# Patient Record
Sex: FEMALE | Race: WHITE | NOT HISPANIC OR LATINO
[De-identification: names, ages, dates, MRNs, and addresses within clinical notes are randomized per-mention and may not be internally consistent; named-entity substitution may affect disease eponyms.]

---

## 2017-06-21 ENCOUNTER — APPOINTMENT (OUTPATIENT)
Dept: OBGYN | Facility: CLINIC | Age: 72
End: 2017-06-21

## 2017-06-21 VITALS
WEIGHT: 134 LBS | HEART RATE: 68 BPM | HEIGHT: 61 IN | SYSTOLIC BLOOD PRESSURE: 154 MMHG | DIASTOLIC BLOOD PRESSURE: 69 MMHG | BODY MASS INDEX: 25.3 KG/M2

## 2017-06-21 DIAGNOSIS — Z01.419 ENCOUNTER FOR GYNECOLOGICAL EXAMINATION (GENERAL) (ROUTINE) W/OUT ABNORMAL FINDINGS: ICD-10-CM

## 2018-06-22 ENCOUNTER — APPOINTMENT (OUTPATIENT)
Dept: OBGYN | Facility: CLINIC | Age: 73
End: 2018-06-22
Payer: MEDICARE

## 2018-06-22 VITALS
WEIGHT: 137 LBS | DIASTOLIC BLOOD PRESSURE: 84 MMHG | BODY MASS INDEX: 25.86 KG/M2 | SYSTOLIC BLOOD PRESSURE: 136 MMHG | HEIGHT: 61 IN

## 2018-06-22 DIAGNOSIS — R10.2 PELVIC AND PERINEAL PAIN: ICD-10-CM

## 2018-06-22 PROCEDURE — G0101: CPT

## 2018-06-22 PROCEDURE — 99213 OFFICE O/P EST LOW 20 MIN: CPT | Mod: 25

## 2018-06-22 RX ORDER — AMITRIPTYLINE HYDROCHLORIDE 10 MG/1
10 TABLET, FILM COATED ORAL
Qty: 28 | Refills: 6 | Status: DISCONTINUED | COMMUNITY
Start: 2018-06-22 | End: 2018-06-22

## 2018-06-26 LAB — CYTOLOGY CVX/VAG DOC THIN PREP: NORMAL

## 2018-09-27 ENCOUNTER — APPOINTMENT (OUTPATIENT)
Dept: OBGYN | Facility: CLINIC | Age: 73
End: 2018-09-27
Payer: MEDICARE

## 2018-09-27 VITALS
HEIGHT: 61 IN | WEIGHT: 135 LBS | BODY MASS INDEX: 25.49 KG/M2 | DIASTOLIC BLOOD PRESSURE: 84 MMHG | SYSTOLIC BLOOD PRESSURE: 142 MMHG

## 2018-09-27 DIAGNOSIS — N89.8 OTHER SPECIFIED NONINFLAMMATORY DISORDERS OF VAGINA: ICD-10-CM

## 2018-09-27 DIAGNOSIS — N95.2 POSTMENOPAUSAL ATROPHIC VAGINITIS: ICD-10-CM

## 2018-09-27 PROCEDURE — 99214 OFFICE O/P EST MOD 30 MIN: CPT

## 2018-10-01 LAB — BACTERIA GENITAL AEROBE CULT: NORMAL

## 2020-12-16 ENCOUNTER — APPOINTMENT (OUTPATIENT)
Dept: OBGYN | Facility: CLINIC | Age: 75
End: 2020-12-16

## 2022-01-04 ENCOUNTER — APPOINTMENT (OUTPATIENT)
Dept: OBGYN | Facility: CLINIC | Age: 77
End: 2022-01-04
Payer: MEDICARE

## 2022-01-04 VITALS
WEIGHT: 133 LBS | DIASTOLIC BLOOD PRESSURE: 80 MMHG | SYSTOLIC BLOOD PRESSURE: 130 MMHG | BODY MASS INDEX: 25.11 KG/M2 | HEIGHT: 61 IN

## 2022-01-04 DIAGNOSIS — M81.0 AGE-RELATED OSTEOPOROSIS W/OUT CURRENT PATHOLOGICAL FRACTURE: ICD-10-CM

## 2022-01-04 DIAGNOSIS — Z00.00 ENCOUNTER FOR GENERAL ADULT MEDICAL EXAMINATION W/OUT ABNORMAL FINDINGS: ICD-10-CM

## 2022-01-04 PROCEDURE — G0101: CPT

## 2022-01-04 PROCEDURE — 99213 OFFICE O/P EST LOW 20 MIN: CPT | Mod: 25

## 2022-01-04 PROCEDURE — 82270 OCCULT BLOOD FECES: CPT

## 2022-01-04 NOTE — PHYSICAL EXAM
[Awake] : awake [Alert] : alert [Acute Distress] : no acute distress [Mass] : no breast mass [Nipple Discharge] : no nipple discharge [Axillary LAD] : no axillary lymphadenopathy [Soft] : soft [Tender] : non tender [Oriented x3] : oriented to person, place, and time [Normal] : uterus [Atrophy] : atrophy [No Bleeding] : there was no active vaginal bleeding [Uterine Adnexae] : were not tender and not enlarged [Occult Blood] : occult blood test from digital rectal exam was negative [Nl Sphincter Tone] : normal sphincter tone [RRR, No Murmurs] : RRR, no murmurs [CTAB] : CTAB

## 2022-01-04 NOTE — HISTORY OF PRESENT ILLNESS
[FreeTextEntry1] : 75 yo pt here for annual exam. no gyn co. has worsening Parkinsons, now not well controlled w meds. No similar fam hx, may have been related to horsebackriding accident. keeps horses at farm and land in [place], daughter Batsheva Fleming and gd still riding and showing. on prolia, bones stabilized.

## 2022-01-10 LAB — CYTOLOGY CVX/VAG DOC THIN PREP: ABNORMAL

## 2022-11-01 DIAGNOSIS — Z12.31 ENCOUNTER FOR SCREENING MAMMOGRAM FOR MALIGNANT NEOPLASM OF BREAST: ICD-10-CM

## 2022-11-01 DIAGNOSIS — N63.0 UNSPECIFIED LUMP IN UNSPECIFIED BREAST: ICD-10-CM

## 2023-05-09 ENCOUNTER — APPOINTMENT (OUTPATIENT)
Dept: ORTHOPEDIC SURGERY | Facility: CLINIC | Age: 78
End: 2023-05-09
Payer: MEDICARE

## 2023-05-09 VITALS
HEART RATE: 80 BPM | BODY MASS INDEX: 23.92 KG/M2 | SYSTOLIC BLOOD PRESSURE: 118 MMHG | WEIGHT: 130 LBS | HEIGHT: 62 IN | DIASTOLIC BLOOD PRESSURE: 80 MMHG | TEMPERATURE: 98.1 F

## 2023-05-09 DIAGNOSIS — M25.552 PAIN IN LEFT HIP: ICD-10-CM

## 2023-05-09 DIAGNOSIS — M70.62 TROCHANTERIC BURSITIS, LEFT HIP: ICD-10-CM

## 2023-05-09 PROCEDURE — 99204 OFFICE O/P NEW MOD 45 MIN: CPT | Mod: 25

## 2023-05-09 PROCEDURE — 20610 DRAIN/INJ JOINT/BURSA W/O US: CPT | Mod: LT

## 2023-05-09 PROCEDURE — 73502 X-RAY EXAM HIP UNI 2-3 VIEWS: CPT

## 2023-05-11 PROBLEM — M25.552 LEFT HIP PAIN: Status: ACTIVE | Noted: 2023-05-09

## 2023-05-11 NOTE — HISTORY OF PRESENT ILLNESS
[de-identified] : 05/09/2023: Patient is a 78 year-old female who presents to the office today for initial evaluation of left hip pain. Pain has been present for the past 3 weeks. No injury or inciting event. Pain is over the lateral aspect of the hip and is sharp in nature. It is worse with palpation. She takes Advil and Tylenol which give some slight relief. She presents today for further treatment options.\par \par No bleeding, fever, chills, sweats, nausea or vomiting were endorsed at this visit. The above history is in addition to the intake form, which I personally reviewed at length, including the patient's medical, surgical, and family history. The patient's allergies were also carefully reviewed. In addition, the family and social history of the patient were also reviewed, which are non-contributory to this visit unless specified above. All of this has been documented accordingly in the visit note.\par \par

## 2023-05-11 NOTE — PROCEDURE
[FreeTextEntry1] : Cortisone Injection: Left Hip - Greater Trochanter\par \par Discussed at length with the patient the planned steroid and lidocaine injection. The risks, benefits, convalescence and alternatives were reviewed. The possible side effects discussed included but were not limited to: pain, swelling, heat and redness. There symptoms are generally mild but if they are extensive then contact the office. Giving pain relievers by mouth such as NSAID’s or Tylenol can generally treat the reactions to  steroid and lidocaine. Rare cases of infection have been noted. Rash, hives and itching may occur post injection. If you have muscle pain or cramps, flushing and or swelling of the face, rapid heart beat, nausea, dizziness, fever, chills, headache, difficulty breathing, swelling in the arms or legs, or have a prickly feeling of your skin, contact a health care provider immediately.\par \par Following this discussion, the lateral aspect of the left hip (greater trochanter) was prepped with Chlorhexidine and under sterile conditions 1cc of 1% lidocaine and 80 mg of Depomedrol was injected with a 22G needle. The needle was introduced to the lateral hip after identifying the tender area, aspiration was performed to ensure no intravenous placement and the medication was injected. Upon withdrawal of the needle the site was cleaned with alcohol and a Band-aid applied. The patient tolerated the injection well and there were no adverse effects. Post injection instructions included no strenuous activity for 24 hours, cryotherapy and if there are any adverse effects to contact the office.\par \par

## 2023-05-11 NOTE — ASSESSMENT
[FreeTextEntry1] : Patient presents with left hip pain. Their symptoms are consistent with greater trochanteric bursitis. The nature of this condition was described at length with the patient and they verbalized understanding. \par \par Recommendations: \par -RICE therapy\par -Cortisone injection to the left greater trochanter\par -Followup with the clinic, Dr. Gramajo/Balbina as needed\par -Patient was given ample time to ask questions and all questions were answered to the patient's full satisfaction.\par \par The patient was in full agreement with this plan and appreciative of their care.\par

## 2023-05-11 NOTE — PHYSICAL EXAM
[UE/LE] : Sensory: Intact in bilateral upper & lower extremities [ALL] : dorsalis pedis, posterior tibial, femoral, popliteal, and radial 2+ and symmetric bilaterally [Normal Finger/nose] : finger to nose coordination [Normal] : no peripheral adenopathy appreciated [Poor Appearance] : well-appearing [Acute Distress] : not in acute distress [de-identified] : Hip Examination \par ROM \par Flexion 125 degrees\par Extension 115 degrees\par External 45 degrees\par Internal 45 Degrees\par Abduction 45 degrees\par Adduction 45 degrees\par \par Length equal bilaterally \par \par No Pain with SLR, No pain with hip motion, No pain to trochanteric Bursa,  \par \par ELY - Negative \par  Impingement negative\par \par Distal pulses 2 +\par Sensation intact to touch throughout leg and equal bilaterally\par  [de-identified] : PUJAR [de-identified] : 3 xray views of the pelvis and left hip were obtained.\par \par -No fractures or dislocations\par -No evidence of hip osteoarthritis

## 2023-05-12 DIAGNOSIS — M54.2 CERVICALGIA: ICD-10-CM

## 2023-05-16 ENCOUNTER — APPOINTMENT (OUTPATIENT)
Dept: ORTHOPEDIC SURGERY | Facility: CLINIC | Age: 78
End: 2023-05-16
Payer: MEDICARE

## 2023-05-16 VITALS
HEART RATE: 83 BPM | SYSTOLIC BLOOD PRESSURE: 131 MMHG | BODY MASS INDEX: 23.92 KG/M2 | DIASTOLIC BLOOD PRESSURE: 84 MMHG | WEIGHT: 130 LBS | HEIGHT: 62 IN

## 2023-05-16 DIAGNOSIS — M54.12 RADICULOPATHY, CERVICAL REGION: ICD-10-CM

## 2023-05-16 DIAGNOSIS — M50.30 OTHER CERVICAL DISC DEGENERATION, UNSPECIFIED CERVICAL REGION: ICD-10-CM

## 2023-05-16 DIAGNOSIS — M89.8X1 OTHER SPECIFIED DISORDERS OF BONE, SHOULDER: ICD-10-CM

## 2023-05-16 PROCEDURE — 99214 OFFICE O/P EST MOD 30 MIN: CPT

## 2023-05-16 PROCEDURE — 72040 X-RAY EXAM NECK SPINE 2-3 VW: CPT

## 2023-05-16 RX ORDER — TIZANIDINE 2 MG/1
2 TABLET ORAL EVERY 6 HOURS
Qty: 30 | Refills: 0 | Status: ACTIVE | COMMUNITY
Start: 2023-05-16 | End: 1900-01-01

## 2023-05-17 NOTE — HISTORY OF PRESENT ILLNESS
[de-identified] : 05/16/2023: Patient is a 78 year-old, right-hand-dominant female, known to my practice, who presents to the office today for initial evaluation of neck and left shoulder pain. Of note, she was seen last week for left hip pain, where she was diagnosed with greater trochanteric bursitis. She received a cortisone injection at that time and endorses improvement in her left hip pain. Her presenting problem today, however, is the neck and left shoulder pain which has been present for many months. The pain is in the left side of the neck and radiates into the posterolateral aspect of the left shoulder. She also explains that she has periscapular pain that is mostly on the left upper back. She denies weakness or paresthesias, but the left side is the side of her body that is most affected by her Parkinson Disease. Physical therapy did give temporary relief in the past, as she has been treated by another physician (last PT visit was January 2023). She presents today for further treatment options.\par

## 2023-05-17 NOTE — ASSESSMENT
[FreeTextEntry1] : Patient presents with . Their symptoms are consistent with severe cervical arthritis. The nature of this condition was described at length with the patient and they verbalized understanding. \par \par Recommendations: \par -Physical therapy\par -Tizanidine (Rx sent to pharmacy)\par -Pain management for possible DANYA\par -Followup with Dr. Denney in 6 to 8 weeks if no improvement\par -Patient was given ample time to ask questions and all questions were answered to the patient's full satisfaction.\par \par The patient was in full agreement with this plan and appreciative of their care.\par

## 2023-05-17 NOTE — PHYSICAL EXAM
[UE/LE] : Sensory: Intact in bilateral upper & lower extremities [ALL] : dorsalis pedis, posterior tibial, femoral, popliteal, and radial 2+ and symmetric bilaterally [Normal Finger/nose] : finger to nose coordination [Normal] : Oriented to person, place, and time, insight and judgement were intact and the affect was normal [Poor Appearance] : well-appearing [Acute Distress] : not in acute distress [de-identified] : LEFT HIP EXAM\par  \par ROM \par Flexion 125 degrees\par Extension 115 degrees\par External 45 degrees\par Internal 45 Degrees\par Abduction 45 degrees\par Adduction 45 degrees\par \par Length equal bilaterally \par \par No Pain with SLR, No pain with hip motion, Minimal pain to trochanteric Bursa which is a significant improvement from last week's findings on exam  \par \par ELY - Negative \par  Impingement negative\par \par Distal pulses 2 +\par Sensation intact to touch throughout leg and equal bilaterally\par \par \par NECK AND BACK EXAM\par \par Gait Steady \par \par Spine \par No bony tenderness, no step-offs, \par \par Cervical  ROM \par Flexion 50 degrees\par Extension 60 Degrees \par Rotation 80 degrees\par Lateral bend 45 degrees\par +TTP of left cervical paraspinal musculature and upper trap\par Spurlings Test Negative\par \par Upper Extremities - Reflexes 2 +, \par Strength\par Shoulder Abduction - 5/5\par Elbow Flexion 5/5\par Elbow Extension 5/5\par Wrist Flexion 5/5\par Wrist Extension 5/5\par  strength 5/5\par Finger Abduction 5/5\par Orellana's test negative \par Sensation intact and equal to light touch bilaterally\par Distal pulses intact \par \par Lumbar ROM\par Flexion 60 degrees\par Extension 25 degrees\par Lateral bend 25 degrees \par \par Lower Extremities Reflexes 2 +, \par Strength \par Hip flexion 5/5\par Knee Extension 5/5\par Dorsi/Plantar flexion 5/5\par EHL 5/5\par Clonus Negative \par Babinski Negative \par SLR - Negative \par Sensation intact and equal to light touch bilaterally\par Distal Pulses intact\par \par \par LEFT SHOULDER EXAM\par \par Inspection: No malalignment, no defects\par Skin: No masses, no lesions, skin intact\par Neck: Negative Spurlings, full ROM without pain\par ROM: Full active ROM of the RIGHT shoulder without pain, Full active ROM of the LEFT shoulder without pain\par ROM Exam: Active FF to _____, abduction to _____, ER to _____, IR to ______.\par Tenderness: No bicipital tenderness, no tenderness to the greater tuberosity/RTC insertion, no anterior shoulder/lesser tuberosity tenderness\par Strength: 5/5 strength against resistance with ER, IR, and Supraspinatus testing.\par AC Joint: No TTP, no pain with cross arm testing.\par Biceps: Speed negative, Yergusons negative\par Impingement Test: Negative Giron\par Empty Can Test: Negative\par Stability: Negative Apprehension, negative anterior/posterior load and shift\par Vascular: 2+ radial pulse\par Neuro: AIN/PIN/Ulnar nn. intact to motor\par Sensation: Grossly intact without deficits\par \par  [de-identified] : PUJAR [de-identified] : 2 xray views of the cervical spine were obtained.\par \par -No fractures or dislocations\par -Significant straightening of the normal cervical lordosis\par -Multilevel degenerative changes, most significant at C4-5 and C5-6

## 2023-05-19 PROBLEM — M54.12 CERVICAL RADICULOPATHY: Status: ACTIVE | Noted: 2023-05-16

## 2023-06-30 ENCOUNTER — APPOINTMENT (OUTPATIENT)
Dept: ORTHOPEDIC SURGERY | Facility: CLINIC | Age: 78
End: 2023-06-30

## 2023-07-18 ENCOUNTER — APPOINTMENT (OUTPATIENT)
Dept: ORTHOPEDIC SURGERY | Facility: CLINIC | Age: 78
End: 2023-07-18

## 2024-09-17 ENCOUNTER — OFFICE (OUTPATIENT)
Dept: URBAN - METROPOLITAN AREA CLINIC 113 | Facility: CLINIC | Age: 79
Setting detail: OPHTHALMOLOGY
End: 2024-09-17
Payer: MEDICARE

## 2024-09-17 ENCOUNTER — RX ONLY (RX ONLY)
Age: 79
End: 2024-09-17

## 2024-09-17 DIAGNOSIS — H40.013: ICD-10-CM

## 2024-09-17 DIAGNOSIS — H43.811: ICD-10-CM

## 2024-09-17 DIAGNOSIS — H16.222: ICD-10-CM

## 2024-09-17 DIAGNOSIS — H25.13: ICD-10-CM

## 2024-09-17 DIAGNOSIS — H16.221: ICD-10-CM

## 2024-09-17 PROCEDURE — 83861 MICROFLUID ANALY TEARS: CPT | Mod: QW,RT | Performed by: OPTOMETRIST

## 2024-09-17 PROCEDURE — 92004 COMPRE OPH EXAM NEW PT 1/>: CPT | Performed by: OPTOMETRIST

## 2024-09-17 PROCEDURE — 83861 MICROFLUID ANALY TEARS: CPT | Mod: QW,LT | Performed by: OPTOMETRIST

## 2024-09-17 ASSESSMENT — LID EXAM ASSESSMENTS
OD_MEIBOMITIS: RLL RUL 1+
OS_MEIBOMITIS: LLL LUL 1+

## 2024-09-17 ASSESSMENT — CONFRONTATIONAL VISUAL FIELD TEST (CVF)
OD_FINDINGS: FULL
OS_FINDINGS: FULL